# Patient Record
Sex: MALE | Race: OTHER | NOT HISPANIC OR LATINO
[De-identification: names, ages, dates, MRNs, and addresses within clinical notes are randomized per-mention and may not be internally consistent; named-entity substitution may affect disease eponyms.]

---

## 2024-03-06 PROBLEM — Z00.00 ENCOUNTER FOR PREVENTIVE HEALTH EXAMINATION: Status: ACTIVE | Noted: 2024-03-06

## 2024-03-12 ENCOUNTER — APPOINTMENT (OUTPATIENT)
Dept: SURGERY | Facility: CLINIC | Age: 54
End: 2024-03-12

## 2024-04-02 ENCOUNTER — APPOINTMENT (OUTPATIENT)
Dept: SURGERY | Facility: CLINIC | Age: 54
End: 2024-04-02

## 2024-04-04 ENCOUNTER — APPOINTMENT (OUTPATIENT)
Dept: INTERNAL MEDICINE | Facility: CLINIC | Age: 54
End: 2024-04-04

## 2024-05-23 ENCOUNTER — APPOINTMENT (OUTPATIENT)
Dept: UROLOGY | Facility: CLINIC | Age: 54
End: 2024-05-23
Payer: COMMERCIAL

## 2024-05-23 VITALS
BODY MASS INDEX: 25.06 KG/M2 | RESPIRATION RATE: 18 BRPM | DIASTOLIC BLOOD PRESSURE: 97 MMHG | TEMPERATURE: 97.5 F | HEIGHT: 72 IN | HEART RATE: 76 BPM | WEIGHT: 185 LBS | SYSTOLIC BLOOD PRESSURE: 162 MMHG | OXYGEN SATURATION: 99 %

## 2024-05-23 DIAGNOSIS — R97.20 ELEVATED PROSTATE, SPECIFIC ANTIGEN [PSA]: ICD-10-CM

## 2024-05-23 DIAGNOSIS — Z78.9 OTHER SPECIFIED HEALTH STATUS: ICD-10-CM

## 2024-05-23 DIAGNOSIS — I10 ESSENTIAL (PRIMARY) HYPERTENSION: ICD-10-CM

## 2024-05-23 PROCEDURE — 99203 OFFICE O/P NEW LOW 30 MIN: CPT

## 2024-05-23 RX ORDER — LOSARTAN POTASSIUM 50 MG/1
50 TABLET, FILM COATED ORAL
Refills: 0 | Status: ACTIVE | COMMUNITY

## 2024-05-23 NOTE — HISTORY OF PRESENT ILLNESS
[FreeTextEntry1] : NBA GUO is a 53 year year old male who presents for consultation for elevated PSA.  Reports feeling well.  Denies any difficulties voiding. Denies gross hematuria, dysuria or associated symptoms.   He reports for 6 years he was living in [place], and PSA around this time was between 3 and 4 ng/mL.  He states in January 2024 his PSA increased to 4.9 ng/mL.  He states that he had gone to his physician and was started on antibiotics.  And repeat PSA after antibiotics was 4.2 ng/mL.  Patient's most recent PSA May 2024 is 4.16 ng/mL.  Denies  PMH including previous kidney stones, recurrent UTIs.  Family History: No  malignancies Social History: From [place].  Labs from May 2024 reviewed: Hemoglobin A1c of 5.8% Creatinine 0.88 eGFR 103  Bladder sonogram May 2024 shows a prostate gland is not well-visualized but it is enlarged and weighs approximately 75 g.  A postvoid residual of 93 cc with a prevoid of 331 cc.  No sludge, discrete calculi, or overt focal mural abnormality depicted.  Bilateral ureteral jets identified with color Doppler.  Consultation requested by Dr. Lyubov Menezes

## 2024-05-23 NOTE — ASSESSMENT
[FreeTextEntry1] : 53-year-old with elevated PSA.  Presents to office for initial consultation.  Reviewed with patient his PSA and PSA trend. Patient and his wife understand that PSA is a screening tool used in diagnosis of prostate cancer. They understand that PSA alone cannot confirm nor exclude the presence of an underlying prostate cancer. I also reviewed patient's sonogram report for him.  He does have a large prostate and this would give him an appropriate PSA density.  I have reviewed the limitations of the ultrasound and patient understands that ultrasound would not be able to delineate any abnormal lesions on his prostate. I recommend patient proceed with MRI of prostate to evaluate for any suspicious prostatic lesions that we will be targeted on MRI guided fusion biopsy. Rationale and indication for MRI prostate clearly outlined and patient verbalized understanding and is agreeable to proceed with MRI of prostate.  We also discussed prostate medications per patient and his wife's request. Patient does not have any bothersome urinary symptoms and does not require any prostate medications at this time. He may be a good candidate for finasteride given his large prostate.  Would require MRI first to assess for suspicious lesions.  After discussion, all patient's questions were answered. He is going to be going to [place] until September 2024. He states he will get MRI and repeat PSA when he returns from [place].  Patient understands that PSA is a screening tool used the diagnosis of prostate cancer and I cannot exclude a dangerous underlying prostate cancer at this time.  Risks of delay of diagnosis clearly outlined and patient and his wife verbalized understanding. I did offer to help patient schedule MRI prior to him leaving for [place].  Patient declines stating that he would rather do this after he returns from [place].  Plan -Follow-up September 2024 with Dr. Molina  -MRI of prostate and repeat PSA prior  Total time of encounter: 35 minutes

## 2024-09-20 ENCOUNTER — APPOINTMENT (OUTPATIENT)
Dept: UROLOGY | Facility: CLINIC | Age: 54
End: 2024-09-20